# Patient Record
Sex: FEMALE | Race: WHITE | URBAN - METROPOLITAN AREA
[De-identification: names, ages, dates, MRNs, and addresses within clinical notes are randomized per-mention and may not be internally consistent; named-entity substitution may affect disease eponyms.]

---

## 2023-01-29 ENCOUNTER — WEB ENCOUNTER (OUTPATIENT)
Dept: URBAN - METROPOLITAN AREA CLINIC 54 | Facility: CLINIC | Age: 23
End: 2023-01-29

## 2023-01-30 ENCOUNTER — WEB ENCOUNTER (OUTPATIENT)
Dept: URBAN - METROPOLITAN AREA CLINIC 54 | Facility: CLINIC | Age: 23
End: 2023-01-30

## 2023-01-30 ENCOUNTER — CLAIMS CREATED FROM THE CLAIM WINDOW (OUTPATIENT)
Dept: URBAN - METROPOLITAN AREA CLINIC 54 | Facility: CLINIC | Age: 23
End: 2023-01-30
Payer: SELF-PAY

## 2023-01-30 VITALS
DIASTOLIC BLOOD PRESSURE: 80 MMHG | HEIGHT: 63 IN | WEIGHT: 224.2 LBS | TEMPERATURE: 97.8 F | SYSTOLIC BLOOD PRESSURE: 119 MMHG | BODY MASS INDEX: 39.73 KG/M2 | HEART RATE: 73 BPM

## 2023-01-30 DIAGNOSIS — R11.2 NAUSEA AND VOMITING, UNSPECIFIED VOMITING TYPE: ICD-10-CM

## 2023-01-30 DIAGNOSIS — R10.13 EPIGASTRIC PAIN: ICD-10-CM

## 2023-01-30 DIAGNOSIS — R19.7 DIARRHEA, UNSPECIFIED TYPE: ICD-10-CM

## 2023-01-30 DIAGNOSIS — K21.9 GASTROESOPHAGEAL REFLUX DISEASE, UNSPECIFIED WHETHER ESOPHAGITIS PRESENT: ICD-10-CM

## 2023-01-30 PROCEDURE — 99244 OFF/OP CNSLTJ NEW/EST MOD 40: CPT

## 2023-01-30 PROCEDURE — 99204 OFFICE O/P NEW MOD 45 MIN: CPT

## 2023-01-30 RX ORDER — PANTOPRAZOLE SODIUM 40 MG/1
1 TABLET TABLET, DELAYED RELEASE ORAL ONCE A DAY
Qty: 30 | Refills: 1 | OUTPATIENT
Start: 2023-01-30

## 2023-01-30 NOTE — PHYSICAL EXAM GASTROINTESTINAL
Abdomen , soft, mild epigastric tenderness to deep palpation, nondistended , no guarding or rigidity , no masses palpable , normal bowel sounds , Liver and Spleen , no hepatomegaly present , no hepatosplenomegaly , liver nontender , spleen not palpable

## 2023-01-30 NOTE — HPI-TODAY'S VISIT:
1/30/23: Patient was referred by TESHA Corrales for GERD. A copy of this document will be sent to the physician. Pt is a 21 yo female with PMH of asthma who presents today complaining of uncontrolled acid reflux, epigastric pain, nausea, vomiting, and diarrhea. Pt states her acid reflux occurs on a daily basis after meals x 1 year. No relief with omeprazole, now on Pepcid 20mg BID with minimal improvement. Also has Has intermittent postprandial epigastric pain that radiates to both sides, early satiety, and nausea with episodes of vomiting every few weeks. In the last month pt has developed diarrhea with 4-5 BMs daily with postprandial urgency. Pt denies frequent NSAIDs, denies alcohol. No prior scopes, no recent imaging. Negative H pylori blood test. Normal routine labs.

## 2023-01-31 LAB
A/G RATIO: 1.1
ABSOLUTE BASOPHILS: 42
ABSOLUTE EOSINOPHILS: 90
ABSOLUTE LYMPHOCYTES: 2076
ABSOLUTE MONOCYTES: 534
ABSOLUTE NEUTROPHILS: 3258
ALBUMIN: 3.9
ALKALINE PHOSPHATASE: 71
ALT (SGPT): 15
AST (SGOT): 14
BASOPHILS: 0.7
BILIRUBIN, TOTAL: 0.5
BUN/CREATININE RATIO: (no result)
BUN: 11
C-REACTIVE PROTEIN, QUANT: 15.8
CALCIUM: 9.3
CARBON DIOXIDE, TOTAL: 29
CHLORIDE: 101
CREATININE: 0.54
EGFR: 135
EOSINOPHILS: 1.5
GLOBULIN, TOTAL: 3.5
GLUCOSE: 79
HEMATOCRIT: 38.9
HEMOGLOBIN: 12.6
IMMUNOGLOBULIN A, QN, SERUM: 300
LYMPHOCYTES: 34.6
MCH: 27.3
MCHC: 32.4
MCV: 84.2
MONOCYTES: 8.9
MPV: 11.4
NEUTROPHILS: 54.3
PLATELET COUNT: 340
POTASSIUM: 4
PROTEIN, TOTAL: 7.4
RDW: 13.1
RED BLOOD CELL COUNT: 4.62
SODIUM: 138
T-TRANSGLUTAMINASE (TTG) IGA: <1
TSH: 1.43
WHITE BLOOD CELL COUNT: 6

## 2023-02-06 LAB
ADENOVIRUS F40/41: NEGATIVE
ASTROVIRUS: NEGATIVE
C.DIFFICILE TOXIN: NEGATIVE
CALPROTECTIN, FECAL: 80
CAMPYLOBACTER SPECIES: NEGATIVE
CRYPTOSPORIDIUM SPECIES: NEGATIVE
CYCLOSPORA CAYETANENSIS: NEGATIVE
ENTAMOEBA HISTOLYTICA: NEGATIVE
ENTEROAGGREGATIVE E. COLI (EAEC): NEGATIVE
ENTEROPATHOGENIC E. COLI (EPEC): NEGATIVE
ENTEROTOXIGENIC E. COLI (ETEC): NEGATIVE
GIARDIA: NEGATIVE
NOROVIRUS GI/GII: NEGATIVE
PLESIOMONAS SHIGELLOIDES: NEGATIVE
ROTAVIRUS: NEGATIVE
SALMONELLA SPECIES: NEGATIVE
SAPOVIRUS: NEGATIVE
SHIGA TOXIN PRODUCING E. COLI: NEGATIVE
SHIGELLA/ENTEROINVASIVE E. COLI: NEGATIVE
SPECIMEN SOURCE: (no result)
VIBRIO CHOLERAE: NEGATIVE
VIBRIO SPECIES: NEGATIVE
YERSINIA SPECIES: NEGATIVE

## 2023-02-07 ENCOUNTER — TELEPHONE ENCOUNTER (OUTPATIENT)
Dept: URBAN - METROPOLITAN AREA CLINIC 54 | Facility: CLINIC | Age: 23
End: 2023-02-07

## 2023-02-07 LAB — PANCREATIC ELASTASE, FECAL: >500

## 2023-02-07 RX ORDER — PANTOPRAZOLE SODIUM 40 MG/1
1 TABLET TABLET, DELAYED RELEASE ORAL ONCE A DAY
Qty: 30 | Refills: 1 | Status: ACTIVE | COMMUNITY
Start: 2023-01-30

## 2023-02-07 RX ORDER — SODIUM, POTASSIUM,MAG SULFATES 17.5-3.13G
354 ML SOLUTION, RECONSTITUTED, ORAL ORAL
Qty: 354 ML | Refills: 0 | OUTPATIENT
Start: 2023-02-07 | End: 2023-02-08

## 2023-02-08 ENCOUNTER — OFFICE VISIT (OUTPATIENT)
Dept: URBAN - METROPOLITAN AREA CLINIC 53 | Facility: CLINIC | Age: 23
End: 2023-02-08
Payer: SELF-PAY

## 2023-02-08 DIAGNOSIS — R10.13 EPIGASTRIC PAIN: ICD-10-CM

## 2023-02-08 DIAGNOSIS — R11.2 NAUSEA AND VOMITING, UNSPECIFIED VOMITING TYPE: ICD-10-CM

## 2023-02-08 PROCEDURE — 76705 ECHO EXAM OF ABDOMEN: CPT | Performed by: INTERNAL MEDICINE

## 2023-02-08 RX ORDER — SODIUM, POTASSIUM,MAG SULFATES 17.5-3.13G
354 ML SOLUTION, RECONSTITUTED, ORAL ORAL
Qty: 354 ML | Refills: 0 | Status: ACTIVE | COMMUNITY
Start: 2023-02-07 | End: 2023-02-08

## 2023-02-08 RX ORDER — PANTOPRAZOLE SODIUM 40 MG/1
1 TABLET TABLET, DELAYED RELEASE ORAL ONCE A DAY
Qty: 30 | Refills: 1 | Status: ACTIVE | COMMUNITY
Start: 2023-01-30

## 2023-02-15 ENCOUNTER — OFFICE VISIT (OUTPATIENT)
Dept: URBAN - METROPOLITAN AREA SURGERY CENTER 14 | Facility: SURGERY CENTER | Age: 23
End: 2023-02-15
Payer: SELF-PAY

## 2023-02-15 DIAGNOSIS — K20.80 ESOPHAGITIS DISSECANS SUPERFICIALIS: ICD-10-CM

## 2023-02-15 DIAGNOSIS — R19.7 ACUTE DIARRHEA: ICD-10-CM

## 2023-02-15 DIAGNOSIS — K31.89 ACQUIRED DEFORMITY OF DUODENUM: ICD-10-CM

## 2023-02-15 DIAGNOSIS — K29.60 ADENOPAPILLOMATOSIS GASTRICA: ICD-10-CM

## 2023-02-15 PROCEDURE — 43239 EGD BIOPSY SINGLE/MULTIPLE: CPT | Performed by: INTERNAL MEDICINE

## 2023-02-15 PROCEDURE — G8907 PT DOC NO EVENTS ON DISCHARG: HCPCS | Performed by: INTERNAL MEDICINE

## 2023-02-28 ENCOUNTER — OFFICE VISIT (OUTPATIENT)
Dept: URBAN - METROPOLITAN AREA SURGERY CENTER 14 | Facility: SURGERY CENTER | Age: 23
End: 2023-02-28
Payer: SELF-PAY

## 2023-02-28 DIAGNOSIS — R19.7 ACUTE DIARRHEA: ICD-10-CM

## 2023-02-28 DIAGNOSIS — K63.89 APPENDICITIS EPIPLOICA: ICD-10-CM

## 2023-02-28 PROCEDURE — G8907 PT DOC NO EVENTS ON DISCHARG: HCPCS | Performed by: INTERNAL MEDICINE

## 2023-02-28 PROCEDURE — 45380 COLONOSCOPY AND BIOPSY: CPT | Performed by: INTERNAL MEDICINE

## 2023-02-28 RX ORDER — MESALAMINE 0.38 G/1
4 CAPSULES IN THE MORNING CAPSULE, EXTENDED RELEASE ORAL ONCE A DAY
Qty: 120 | Refills: 2 | OUTPATIENT
Start: 2023-03-01 | End: 2023-05-30

## 2023-02-28 RX ORDER — PANTOPRAZOLE SODIUM 40 MG/1
1 TABLET TABLET, DELAYED RELEASE ORAL ONCE A DAY
Qty: 30 | Refills: 1 | Status: ACTIVE | COMMUNITY
Start: 2023-01-30

## 2023-03-08 ENCOUNTER — OFFICE VISIT (OUTPATIENT)
Dept: URBAN - METROPOLITAN AREA CLINIC 54 | Facility: CLINIC | Age: 23
End: 2023-03-08
Payer: SELF-PAY

## 2023-03-08 VITALS
SYSTOLIC BLOOD PRESSURE: 121 MMHG | HEART RATE: 82 BPM | BODY MASS INDEX: 40.22 KG/M2 | DIASTOLIC BLOOD PRESSURE: 73 MMHG | HEIGHT: 63 IN | TEMPERATURE: 97.4 F | WEIGHT: 227 LBS

## 2023-03-08 DIAGNOSIS — K21.9 GASTROESOPHAGEAL REFLUX DISEASE, UNSPECIFIED WHETHER ESOPHAGITIS PRESENT: ICD-10-CM

## 2023-03-08 DIAGNOSIS — K52.9 ILEITIS: ICD-10-CM

## 2023-03-08 DIAGNOSIS — K90.0 CELIAC DISEASE: ICD-10-CM

## 2023-03-08 DIAGNOSIS — K29.30 CHRONIC SUPERFICIAL GASTRITIS WITHOUT BLEEDING: ICD-10-CM

## 2023-03-08 PROBLEM — 196735001: Status: ACTIVE | Noted: 2023-03-08

## 2023-03-08 PROBLEM — 235595009: Status: ACTIVE | Noted: 2023-01-30

## 2023-03-08 PROCEDURE — 99214 OFFICE O/P EST MOD 30 MIN: CPT | Performed by: INTERNAL MEDICINE

## 2023-03-08 RX ORDER — MESALAMINE 0.38 G/1
4 CAPSULES IN THE MORNING CAPSULE, EXTENDED RELEASE ORAL ONCE A DAY
Qty: 120 | Refills: 2 | Status: ACTIVE | COMMUNITY
Start: 2023-03-01 | End: 2023-05-30

## 2023-03-08 RX ORDER — PANTOPRAZOLE SODIUM 40 MG/1
1 TABLET TABLET, DELAYED RELEASE ORAL ONCE A DAY
OUTPATIENT
Start: 2023-01-30

## 2023-03-08 RX ORDER — OMEPRAZOLE 20 MG/1
1 CAPSULE 30 MINUTES BEFORE  MEAL CAPSULE, DELAYED RELEASE ORAL TWICE A DAY
Qty: 60 | Refills: 5 | OUTPATIENT
Start: 2023-03-08

## 2023-03-08 RX ORDER — MESALAMINE 0.38 G/1
4 CAPSULES IN THE MORNING CAPSULE, EXTENDED RELEASE ORAL ONCE A DAY
OUTPATIENT
Start: 2023-03-01

## 2023-03-08 RX ORDER — PANTOPRAZOLE SODIUM 40 MG/1
1 TABLET TABLET, DELAYED RELEASE ORAL ONCE A DAY
Qty: 30 | Refills: 1 | Status: ON HOLD | COMMUNITY
Start: 2023-01-30

## 2023-03-08 NOTE — HPI-TODAY'S VISIT:
1/30/23: Patient was referred by TESHA Corrales for GERD. A copy of this document will be sent to the physician. Pt is a 23 yo female with PMH of asthma who presents today complaining of uncontrolled acid reflux, epigastric pain, nausea, vomiting, and diarrhea. Pt states her acid reflux occurs on a daily basis after meals x 1 year. No relief with omeprazole, now on Pepcid 20mg BID with minimal improvement. Also has Has intermittent postprandial epigastric pain that radiates to both sides, early satiety, and nausea with episodes of vomiting every few weeks. In the last month pt has developed diarrhea with 4-5 BMs daily with postprandial urgency. Pt denies frequent NSAIDs, denies alcohol. No prior scopes, no recent imaging. Negative H pylori blood test. Normal routine labs.  3/8/23 Follow up: Pt presens for follow up. Labs were significant for elevated CRP at 15.8 and borderline fecal tonie at 80. Negative celiac serologies. EGD was significant for GERD with free flow of gastric contents into esophagus, chronic gastritis without H pylori. Pantoprazole was not covered by insurance so she is not on a PPI. The duodenum was normal endoscopically but with abnormal biopsies suggestive of early celiac disease or other enteropathy (increased intraepithelial lymphocytes within several villi without villous blunting or distortion). Pt also had a colonoscopy that was significant for ileitis, pending biopsies. She has been started on mesalamine but her symptoms are unchanged. Continues to have diarrhea. Complains of weight gain. RUQ US was normal.

## 2023-03-14 LAB
DQ2 (DQA1 0501/0505,DQB1 02XX): POSITIVE
DQ8 (DQA1 03XX, DQB1 0302): NEGATIVE
FERRITIN, SERUM: 13
FOLATE (FOLIC ACID), SERUM: 9.1
HLA DQB1*: 201
HLA VARIANTS DETECTED: HLA DQA1*: 5
INTERPRETATION: (no result)
IRON BIND.CAP.(TIBC): 411
IRON SATURATION: 20
IRON: 84
RESULTS REVIEWED BY:: (no result)
VITAMIN B12: 220
VITAMIN D,25-OH,TOTAL,IA: 18

## 2023-03-15 ENCOUNTER — TELEPHONE ENCOUNTER (OUTPATIENT)
Dept: URBAN - METROPOLITAN AREA CLINIC 35 | Facility: CLINIC | Age: 23
End: 2023-03-15

## 2023-03-15 RX ORDER — FAMOTIDINE 20 MG
1 CAPSULE TABLET ORAL ONCE A DAY
Qty: 90 CAPSULE | Refills: 0 | OUTPATIENT
Start: 2023-03-15 | End: 2023-06-13

## 2023-03-15 RX ORDER — FERROUS SULFATE 325(65) MG
1 TABLET TABLET ORAL ONCE A DAY
Qty: 90 TABLET | Refills: 0 | OUTPATIENT
Start: 2023-03-15

## 2023-06-12 ENCOUNTER — OFFICE VISIT (OUTPATIENT)
Dept: URBAN - METROPOLITAN AREA CLINIC 54 | Facility: CLINIC | Age: 23
End: 2023-06-12

## 2023-06-14 ENCOUNTER — OFFICE VISIT (OUTPATIENT)
Dept: URBAN - METROPOLITAN AREA CLINIC 54 | Facility: CLINIC | Age: 23
End: 2023-06-14
Payer: SELF-PAY

## 2023-06-14 VITALS
DIASTOLIC BLOOD PRESSURE: 76 MMHG | HEART RATE: 75 BPM | HEIGHT: 63 IN | BODY MASS INDEX: 40.47 KG/M2 | TEMPERATURE: 97.4 F | WEIGHT: 228.4 LBS | SYSTOLIC BLOOD PRESSURE: 120 MMHG

## 2023-06-14 DIAGNOSIS — R19.7 DIARRHEA, UNSPECIFIED TYPE: ICD-10-CM

## 2023-06-14 DIAGNOSIS — K21.9 GASTROESOPHAGEAL REFLUX DISEASE, UNSPECIFIED WHETHER ESOPHAGITIS PRESENT: ICD-10-CM

## 2023-06-14 DIAGNOSIS — D50.8 ACQUIRED IRON DEFICIENCY ANEMIA DUE TO DECREASED ABSORPTION: ICD-10-CM

## 2023-06-14 DIAGNOSIS — K90.0 CELIAC DISEASE: ICD-10-CM

## 2023-06-14 PROBLEM — 34713006: Status: ACTIVE | Noted: 2023-06-14

## 2023-06-14 PROBLEM — 396331005: Status: ACTIVE | Noted: 2023-03-08

## 2023-06-14 PROCEDURE — 99214 OFFICE O/P EST MOD 30 MIN: CPT | Performed by: INTERNAL MEDICINE

## 2023-06-14 RX ORDER — FERROUS SULFATE 325(65) MG
1 TABLET TABLET ORAL ONCE A DAY
Qty: 90 TABLET | Refills: 0 | Status: ON HOLD | COMMUNITY
Start: 2023-03-15

## 2023-06-14 RX ORDER — MESALAMINE 0.38 G/1
4 CAPSULES IN THE MORNING CAPSULE, EXTENDED RELEASE ORAL ONCE A DAY
Status: ON HOLD | COMMUNITY
Start: 2023-03-01

## 2023-06-14 RX ORDER — OMEPRAZOLE 20 MG/1
1 CAPSULE 30 MINUTES BEFORE  MEAL CAPSULE, DELAYED RELEASE ORAL TWICE A DAY
Qty: 60 | Refills: 5 | Status: ON HOLD | COMMUNITY
Start: 2023-03-08

## 2023-06-14 NOTE — HPI-TODAY'S VISIT:
1/30/23: Patient was referred by TESHA Corrales for GERD. A copy of this document will be sent to the physician. Pt is a 21 yo female with PMH of asthma who presents today complaining of uncontrolled acid reflux, epigastric pain, nausea, vomiting, and diarrhea. Pt states her acid reflux occurs on a daily basis after meals x 1 year. No relief with omeprazole, now on Pepcid 20mg BID with minimal improvement. Also has Has intermittent postprandial epigastric pain that radiates to both sides, early satiety, and nausea with episodes of vomiting every few weeks. In the last month pt has developed diarrhea with 4-5 BMs daily with postprandial urgency. Pt denies frequent NSAIDs, denies alcohol. No prior scopes, no recent imaging. Negative H pylori blood test. Normal routine labs.  3/8/23 Follow up: Pt presents for follow up. Labs were significant for elevated CRP at 15.8 and borderline fecal tonie at 80. Negative celiac serologies. EGD was significant for GERD with free flow of gastric contents into esophagus, chronic gastritis without H pylori. Pantoprazole was not covered by insurance so she is not on a PPI. The duodenum was normal endoscopically but with abnormal biopsies suggestive of early celiac disease or other enteropathy (increased intraepithelial lymphocytes within several villi without villous blunting or distortion). Pt also had a colonoscopy that was significant for ileitis, pending biopsies. She has been started on mesalamine but her symptoms are unchanged. Continues to have diarrhea. Complains of weight gain. RUQ US was normal.  6/14/23 Follow up: Pt presents for follow up. EGD biopsies showed increased intraepithelial lymphocytes in the duodenum, and HLA DQ2 was positive confirming celiac disease. Random colon biopsies and terminal ileum biopsies were normal, no Crohn's disease. Pt discontinued mesalamine on her own because it made her feel worse. She is currently still eating gluten and complains of ongoing diarrhea, but no vomiting and rare abdominal pain. GERD is controlled on prn Pepcid. Deficient in vit D, B12, and iron. Taking D and B12 but could not tolerated oral iron so d/c. Has not seen a dietician.

## 2023-06-15 LAB
FERRITIN, SERUM: 14
FOLATE (FOLIC ACID), SERUM: 14.6
IRON BIND.CAP.(TIBC): 423
IRON SATURATION: 13
IRON: 57
VITAMIN B12: 305
VITAMIN D,25-OH,TOTAL,IA: 32

## 2023-06-19 ENCOUNTER — OFFICE VISIT (OUTPATIENT)
Dept: URBAN - METROPOLITAN AREA TELEHEALTH 2 | Facility: TELEHEALTH | Age: 23
End: 2023-06-19
Payer: SELF-PAY

## 2023-06-19 DIAGNOSIS — K90.0 CELIAC DISEASE: ICD-10-CM

## 2023-06-19 PROCEDURE — 97802 MEDICAL NUTRITION INDIV IN: CPT | Performed by: DIETITIAN, REGISTERED

## 2023-06-19 RX ORDER — FERROUS SULFATE 325(65) MG
1 TABLET TABLET ORAL ONCE A DAY
Qty: 90 TABLET | Refills: 0 | Status: ON HOLD | COMMUNITY
Start: 2023-03-15

## 2023-06-19 RX ORDER — OMEPRAZOLE 20 MG/1
1 CAPSULE 30 MINUTES BEFORE  MEAL CAPSULE, DELAYED RELEASE ORAL TWICE A DAY
Qty: 60 | Refills: 5 | Status: ON HOLD | COMMUNITY
Start: 2023-03-08

## 2023-06-19 RX ORDER — MESALAMINE 0.38 G/1
4 CAPSULES IN THE MORNING CAPSULE, EXTENDED RELEASE ORAL ONCE A DAY
Status: ON HOLD | COMMUNITY
Start: 2023-03-01

## 2023-12-18 ENCOUNTER — OFFICE VISIT (OUTPATIENT)
Dept: URBAN - METROPOLITAN AREA CLINIC 54 | Facility: CLINIC | Age: 23
End: 2023-12-18

## 2024-01-09 ENCOUNTER — OFFICE VISIT (OUTPATIENT)
Dept: URBAN - METROPOLITAN AREA CLINIC 54 | Facility: CLINIC | Age: 24
End: 2024-01-09

## 2024-01-18 ENCOUNTER — DASHBOARD ENCOUNTERS (OUTPATIENT)
Age: 24
End: 2024-01-18

## 2024-01-18 ENCOUNTER — OFFICE VISIT (OUTPATIENT)
Dept: URBAN - METROPOLITAN AREA CLINIC 54 | Facility: CLINIC | Age: 24
End: 2024-01-18
Payer: SELF-PAY

## 2024-01-18 VITALS
HEIGHT: 63 IN | SYSTOLIC BLOOD PRESSURE: 117 MMHG | HEART RATE: 77 BPM | WEIGHT: 223 LBS | DIASTOLIC BLOOD PRESSURE: 74 MMHG | TEMPERATURE: 97.3 F | BODY MASS INDEX: 39.51 KG/M2

## 2024-01-18 DIAGNOSIS — R10.84 GENERALIZED ABDOMINAL PAIN: ICD-10-CM

## 2024-01-18 DIAGNOSIS — K21.9 GASTROESOPHAGEAL REFLUX DISEASE, UNSPECIFIED WHETHER ESOPHAGITIS PRESENT: ICD-10-CM

## 2024-01-18 DIAGNOSIS — R31.9 HEMATURIA, UNSPECIFIED TYPE: ICD-10-CM

## 2024-01-18 DIAGNOSIS — K90.0 CELIAC DISEASE: ICD-10-CM

## 2024-01-18 PROBLEM — 386804004: Status: ACTIVE | Noted: 2024-01-18

## 2024-01-18 PROCEDURE — 99214 OFFICE O/P EST MOD 30 MIN: CPT

## 2024-01-18 RX ORDER — FERROUS SULFATE 325(65) MG
1 TABLET TABLET ORAL ONCE A DAY
Qty: 90 TABLET | Refills: 0 | Status: ON HOLD | COMMUNITY
Start: 2023-03-15

## 2024-01-18 RX ORDER — MESALAMINE 0.38 G/1
4 CAPSULES IN THE MORNING CAPSULE, EXTENDED RELEASE ORAL ONCE A DAY
Status: ON HOLD | COMMUNITY
Start: 2023-03-01

## 2024-01-18 RX ORDER — OMEPRAZOLE 20 MG/1
1 CAPSULE 30 MINUTES BEFORE  MEAL CAPSULE, DELAYED RELEASE ORAL TWICE A DAY
Qty: 60 | Refills: 5 | Status: ON HOLD | COMMUNITY
Start: 2023-03-08

## 2024-07-18 ENCOUNTER — OFFICE VISIT (OUTPATIENT)
Dept: URBAN - METROPOLITAN AREA CLINIC 54 | Facility: CLINIC | Age: 24
End: 2024-07-18

## 2024-07-18 RX ORDER — MESALAMINE 0.38 G/1
4 CAPSULES IN THE MORNING CAPSULE, EXTENDED RELEASE ORAL ONCE A DAY
COMMUNITY
Start: 2023-03-01

## 2024-07-18 RX ORDER — FERROUS SULFATE 325(65) MG
1 TABLET TABLET ORAL ONCE A DAY
Qty: 90 TABLET | Refills: 0 | COMMUNITY
Start: 2023-03-15

## 2024-07-18 RX ORDER — OMEPRAZOLE 20 MG/1
1 CAPSULE 30 MINUTES BEFORE  MEAL CAPSULE, DELAYED RELEASE ORAL TWICE A DAY
Qty: 60 | Refills: 5 | COMMUNITY
Start: 2023-03-08

## 2024-08-14 ENCOUNTER — OFFICE VISIT (OUTPATIENT)
Dept: URBAN - METROPOLITAN AREA CLINIC 54 | Facility: CLINIC | Age: 24
End: 2024-08-14

## 2024-09-03 ENCOUNTER — OFFICE VISIT (OUTPATIENT)
Dept: URBAN - METROPOLITAN AREA CLINIC 54 | Facility: CLINIC | Age: 24
End: 2024-09-03
Payer: SELF-PAY

## 2024-09-03 ENCOUNTER — TELEPHONE ENCOUNTER (OUTPATIENT)
Dept: URBAN - METROPOLITAN AREA CLINIC 54 | Facility: CLINIC | Age: 24
End: 2024-09-03

## 2024-09-03 VITALS
WEIGHT: 251.6 LBS | HEART RATE: 79 BPM | HEIGHT: 63 IN | BODY MASS INDEX: 44.58 KG/M2 | DIASTOLIC BLOOD PRESSURE: 85 MMHG | TEMPERATURE: 98.1 F | SYSTOLIC BLOOD PRESSURE: 127 MMHG

## 2024-09-03 DIAGNOSIS — K52.89 OTHER NONINFECTIOUS GASTROENTERITIS: ICD-10-CM

## 2024-09-03 DIAGNOSIS — K90.0 CELIAC DISEASE: ICD-10-CM

## 2024-09-03 DIAGNOSIS — K21.9 GASTROESOPHAGEAL REFLUX DISEASE, UNSPECIFIED WHETHER ESOPHAGITIS PRESENT: ICD-10-CM

## 2024-09-03 DIAGNOSIS — E61.1 IRON DEFICIENCY: ICD-10-CM

## 2024-09-03 PROCEDURE — 99214 OFFICE O/P EST MOD 30 MIN: CPT

## 2024-09-03 RX ORDER — PANTOPRAZOLE SODIUM 40 MG/1
1 TABLET 30 MINUTES BEFORE MORNING MEAL TABLET, DELAYED RELEASE ORAL ONCE A DAY
Qty: 90 TABLET | Refills: 1 | OUTPATIENT
Start: 2024-09-03

## 2024-09-03 NOTE — HPI-TODAY'S VISIT:
1/30/23: Patient was referred by TESHA Corrales for GERD. A copy of this document will be sent to the physician. Pt is a 23 yo female with PMH of asthma who presents today complaining of uncontrolled acid reflux, epigastric pain, nausea, vomiting, and diarrhea. Pt states her acid reflux occurs on a daily basis after meals x 1 year. No relief with omeprazole, now on Pepcid 20mg BID with minimal improvement. Also has Has intermittent postprandial epigastric pain that radiates to both sides, early satiety, and nausea with episodes of vomiting every few weeks. In the last month pt has developed diarrhea with 4-5 BMs daily with postprandial urgency. Pt denies frequent NSAIDs, denies alcohol. No prior scopes, no recent imaging. Negative H pylori blood test. Normal routine labs.  3/8/23 Follow up: Pt presents for follow up. Labs were significant for elevated CRP at 15.8 and borderline fecal tonie at 80. Negative celiac serologies. EGD was significant for GERD with free flow of gastric contents into esophagus, chronic gastritis without H pylori. Pantoprazole was not covered by insurance so she is not on a PPI. The duodenum was normal endoscopically but with abnormal biopsies suggestive of early celiac disease or other enteropathy (increased intraepithelial lymphocytes within several villi without villous blunting or distortion). Pt also had a colonoscopy that was significant for ileitis, pending biopsies. She has been started on mesalamine but her symptoms are unchanged. Continues to have diarrhea. Complains of weight gain. RUQ US was normal.  6/14/23 Follow up: Pt presents for follow up. EGD biopsies showed increased intraepithelial lymphocytes in the duodenum, and HLA DQ2 was positive confirming celiac disease. Random colon biopsies and terminal ileum biopsies were normal, no Crohn's disease. Pt discontinued mesalamine on her own because it made her feel worse. She is currently still eating gluten and complains of ongoing diarrhea, but no vomiting and rare abdominal pain. GERD is controlled on prn Pepcid. Deficient in vit D, B12, and iron. Taking D and B12 but could not tolerated oral iron so d/c. Has not seen a dietician.  1/18/24 Follow up: Pt presents for ER f/u from [place] on 1/14 for abd pain. Pt states she has generalized abd pain, all the time, every day. States it may have been worse the day she went to the ER, prompting her to go. No nausea or vomiting. Has some postprandial diarrhea but not more than usual. Not following gluten free diet. Reports periods have been abnormal; pt has been spotting for several weeks but has multiple abnormal pregnancy tests. No blood on her pad/panty liner but has blood in commode and when she wipes after urinating. No rectal bleeding. No rectal bleeding. ER records not available but pt showed labs on phone - normal CBC, CMP, lipase. Large amount of blood on UA, otherwise normal.  9/3/24 Follow Up: Pt returns for 6 month follow up. Continues to have nausea and vomiting after meals. Sometimes have has epigastric pain. Alternating bowel habits. Has not tried GFD, lost paperwork from visit with FAR[facility]

## 2024-09-26 NOTE — HPI-TODAY'S VISIT:
1/30/23: Patient was referred by TESHA Corrales for GERD. A copy of this document will be sent to the physician. Pt is a 23 yo female with PMH of asthma who presents today complaining of uncontrolled acid reflux, epigastric pain, nausea, vomiting, and diarrhea. Pt states her acid reflux occurs on a daily basis after meals x 1 year. No relief with omeprazole, now on Pepcid 20mg BID with minimal improvement. Also has Has intermittent postprandial epigastric pain that radiates to both sides, early satiety, and nausea with episodes of vomiting every few weeks. In the last month pt has developed diarrhea with 4-5 BMs daily with postprandial urgency. Pt denies frequent NSAIDs, denies alcohol. No prior scopes, no recent imaging. Negative H pylori blood test. Normal routine labs.  3/8/23 Follow up: Pt presents for follow up. Labs were significant for elevated CRP at 15.8 and borderline fecal tonie at 80. Negative celiac serologies. EGD was significant for GERD with free flow of gastric contents into esophagus, chronic gastritis without H pylori. Pantoprazole was not covered by insurance so she is not on a PPI. The duodenum was normal endoscopically but with abnormal biopsies suggestive of early celiac disease or other enteropathy (increased intraepithelial lymphocytes within several villi without villous blunting or distortion). Pt also had a colonoscopy that was significant for ileitis, pending biopsies. She has been started on mesalamine but her symptoms are unchanged. Continues to have diarrhea. Complains of weight gain. RUQ US was normal.  6/14/23 Follow up: Pt presents for follow up. EGD biopsies showed increased intraepithelial lymphocytes in the duodenum, and HLA DQ2 was positive confirming celiac disease. Random colon biopsies and terminal ileum biopsies were normal, no Crohn's disease. Pt discontinued mesalamine on her own because it made her feel worse. She is currently still eating gluten and complains of ongoing diarrhea, but no vomiting and rare abdominal pain. GERD is controlled on prn Pepcid. Deficient in vit D, B12, and iron. Taking D and B12 but could not tolerated oral iron so d/c. Has not seen a dietician.  1/18/24 Follow up: Pt presents for ER f/u from [place] on 1/14 for abd pain. Pt states she has generalized abd pain, all the time, every day. States it may have been worse the day she went to the ER, prompting her to go. No nausea or vomiting. Has some postprandial diarrhea but not more than usual. Not following gluten free diet. Reports periods have been abnormal; pt has been spotting for several weeks but has multiple abnormal pregnancy tests. No blood on her pad/panty liner but has blood in commode and when she wipes after urinating. No rectal bleeding. No rectal bleeding. ER records not available but pt showed labs on phone - normal CBC, CMP, lipase. Large amount of blood on UA, otherwise normal. 97

## 2025-03-06 ENCOUNTER — OFFICE VISIT (OUTPATIENT)
Dept: URBAN - NONMETROPOLITAN AREA CLINIC 4 | Facility: CLINIC | Age: 25
End: 2025-03-06

## 2025-06-20 ENCOUNTER — OFFICE VISIT (OUTPATIENT)
Dept: URBAN - NONMETROPOLITAN AREA CLINIC 4 | Facility: CLINIC | Age: 25
End: 2025-06-20

## 2025-08-05 ENCOUNTER — OFFICE VISIT (OUTPATIENT)
Dept: URBAN - NONMETROPOLITAN AREA CLINIC 4 | Facility: CLINIC | Age: 25
End: 2025-08-05